# Patient Record
(demographics unavailable — no encounter records)

---

## 2024-11-06 NOTE — ASSESSMENT
[FreeTextEntry1] : Right BPPV The patient has benign paroxysmal positional vertigo of the posterior canal on the RIGHT -We reviewed the anatomy and etiology of the disease with the patient. -Epley maneuver was performed for the affected side in clinic today. -BPPV information sheet given to patient. -We have recommended that the patient sleep upright for 2 nights. -F/u in 1 week - if still imbalanced, order MRI and VNG  Bilateral Cerumen Impaction - Bilateral cerumen impaction removed under microscopy with instrumentation   Nasal Obstruction - Dymista 1 spray to each nostril bid - discussed the risks of Dymista spray which include but are not limited to: epistaxis, dry nose, pharyngitis, nasal irritation/pain, headache, sore throat, congestion, sinusitis, rhinorrhea - f/u in 1 week

## 2024-11-06 NOTE — PHYSICAL EXAM
[TextEntry] : General: AAO, no significant distress Psychiatric: affect pleasant and within normal limits Eyes: relatively symmetric, no obvious nystagmus Skin: no significant lesions on face Nose: no significant lesions; patent. Oral Cavity & Oropharynx: no significant deformity or lesions Neck/Lymphatics: no significant masses or abnormal cervical nodes palpated Respiratory: breathing comfortably; no significant distress Neurologic: cranial nerves II-XII grossly intact; EOMI Facial function: symmetric   Ear examination performed under binocular otologic microscope: Left Ear External: Pinna and periauricular area is normal. Canal: Ear canal skin is not inflamed or edematous. Left cerumen impaction cleaned under microscopy with instrumentation Tympanic Membrane: Intact and in good position.   Right Ear External: Pinna and periauricular area is normal. Canal: Ear canal skin is not inflamed or edematous. Right cerumen impaction cleaned under microscopy with instrumentation Tympanic Membrane: Intact and in good position.   Procedure: Left cerumen removal Pre-operative Diagnosis: Left cerumen impaction Post-operative Diagnosis: Left cerumen impaction Anesthesia: None Procedure Details: The patient was placed in the supine position. The left ear canal was determined to be impacted with cerumen. A curette and/or suction was used to remove the cerumen impaction under microscopy. Condition: Stable. Patient tolerated procedure well. Complications: None.   Procedure: Right cerumen removal Pre-operative Diagnosis: Right cerumen impaction Post-operative Diagnosis: Right cerumen impaction Anesthesia: None Procedure Details: The patient was placed in the supine position. The right ear canal was determined to be impacted with cerumen. A curette and/or suction was used to remove the cerumen impaction under microscopy. Condition: Stable. Patient tolerated procedure well. Complications: None.  Nasal Endoscopy:   Pre-operative Diagnosis: nasal congestion Post-operative Diagnosis: same Anesthesia: None Procedure: Bilateral nasal endoscopy Procedure Details:   The patient was placed in the seated position sitting straight up. The telescope was passed along the left nasal floor to the nasopharynx. It was then passed into the region of the middle meatus, middle turbinate, and the sphenoethmoid region. An identical procedure was performed on the right side.   Findings: Middle meatus: normal bilaterally Sphenoethmoidal recess: normal bilaterally Mucosa: normal bilaterally Nasal septum: OFF CREST BILATERALLY Discharge: none bilaterally Turbinates: ENLARGED INFERIOR TURBINATES BILATERALLY Adenoid: normal bilaterally Posterior choanae: normal bilaterally Eustachian tubes: normal bilaterally Mucous stranding: normal bilaterally Lesions: Not present   Comments: LOTS OF MUCOUS PRODUCTION   Condition: Stable. Patient tolerated procedure well.   [place] Hallpike POSITIVE on RIGHT, negative on left  Epley Manuever Procedure (Right): The patient was sat upright on a table. The patient's head was turned 45 degrees to the right and the patient was quickly laid down supine on the table with their head hanging off of the table. The patient endorsed vertigo. The patient was kept in this position until the vertigo subsided. The patient head was then turned 90 degrees to the left. This position was held for 15 seconds. The patient was then turned onto their left shoulder with their head kept in the same position such that they were looking at the ground. This position was held for 15 seconds. The patient was sat upright with their head tucked into their chest. The patient tolerated the procedure well. The patient was instructed to keep their head in a neutral position for 7 days and to sleep upright as best as possible for the next 2 nights.

## 2024-11-06 NOTE — HISTORY OF PRESENT ILLNESS
[de-identified] : 73F who presents with vertigo since 2022. When she lays down in bed, she has a few seconds of true spinning. For the past few years, she has felt slightly imbalance and wobbly while walking. No otalgia, otorrhea, tinnitus, hearing loss, pressure/fullness.

## 2024-11-13 NOTE — HISTORY OF PRESENT ILLNESS
[de-identified] : 73F who presents with vertigo since 2022. When she lays down in bed, she has a few seconds of true spinning. For the past few years, she has felt slightly imbalance and wobbly while walking. No otalgia, otorrhea, tinnitus, hearing loss, pressure/fullness.   11/13/24: F/u for R BPPV. She states that it has improved after the Epley maneuver. She also has right sided hearing loss. She also endorses snoring at night. She says she got an outside sleep study in the past.

## 2024-11-13 NOTE — ASSESSMENT
[FreeTextEntry1] : Right BPPV The patient has benign paroxysmal positional vertigo of the posterior canal on the RIGHT - resolved after epley manuever - patient is going to vestibular therapy  Nasal Obstruction - Continue Dymista 1 spray to each nostril bid - discussed the risks of Dymista spray which include but are not limited to: epistaxis, dry nose, pharyngitis, nasal irritation/pain, headache, sore throat, congestion, sinusitis, rhinorrhea  Conductive Hearing Loss - RIGHT - chronic conductive hearing loss with exacerbation - discussed the possible etiology of conductive hearing loss which includes otosclerosis, lateral chain fixation - discussed observation vs hearing aids vs surgery - discussed right middle ear exploration, possible stapedotomy, fascia graft - risks of middle ear exploration, stapedotomy discussed in detail and patient's questions answered. Risks include but are not limited to: perforation of eardrum, no improvement in hearing, increased hearing loss, dizziness, tinnitus (ringing), facial weakness, change in taste, infection, bleeding, extruded prosthesis, 1% risk of a dead ear - CT temporal bone scan wo contrast - f/u in 1 month

## 2024-11-13 NOTE — HISTORY OF PRESENT ILLNESS
[de-identified] : 73F who presents with vertigo since 2022. When she lays down in bed, she has a few seconds of true spinning. For the past few years, she has felt slightly imbalance and wobbly while walking. No otalgia, otorrhea, tinnitus, hearing loss, pressure/fullness.   11/13/24: F/u for R BPPV. She states that it has improved after the Epley maneuver. She also has right sided hearing loss. She also endorses snoring at night. She says she got an outside sleep study in the past.

## 2024-11-13 NOTE — PHYSICAL EXAM
[TextEntry] : General: AAO, no significant distress Psychiatric: affect pleasant and within normal limits Eyes: relatively symmetric, no obvious nystagmus Skin: no significant lesions on face Nose: no significant lesions; patent. Oral Cavity & Oropharynx: no significant deformity or lesions Neck/Lymphatics: no significant masses or abnormal cervical nodes palpated Respiratory: breathing comfortably; no significant distress Neurologic: cranial nerves II-XII grossly intact; EOMI Facial function: symmetric   Ear examination performed under binocular otologic microscope: Left Ear External: Pinna and periauricular area is normal. Canal: Ear canal skin is not inflamed or edematous. Tympanic Membrane: Intact and in good position.   Right Ear External: Pinna and periauricular area is normal. Canal: Ear canal skin is not inflamed or edematous. Tympanic Membrane: Intact and in good position.  [place] Hallpike negative on RIGHT, negative on left BC>AC bilaterally, Mondragon to RIGHT   Nasal Endoscopy:   Pre-operative Diagnosis: nasal congestion Post-operative Diagnosis: same Anesthesia: None Procedure: Bilateral nasal endoscopy Procedure Details:   The patient was placed in the seated position sitting straight up. The telescope was passed along the left nasal floor to the nasopharynx. It was then passed into the region of the middle meatus, middle turbinate, and the sphenoethmoid region. An identical procedure was performed on the right side.   Findings: Middle meatus: normal bilaterally Sphenoethmoidal recess: normal bilaterally Mucosa: normal bilaterally Nasal septum: OFF CREST BILATERALLY Discharge: none bilaterally Turbinates: ENLARGED INFERIOR TURBINATES BILATERALLY -> improved Adenoid: normal bilaterally Posterior choanae: normal bilaterally Eustachian tubes: normal bilaterally Mucous stranding: normal bilaterally Lesions: Not present   Comments: less mucous   Condition: Stable. Patient tolerated procedure well.

## 2024-11-13 NOTE — PHYSICAL EXAM
[TextEntry] : General: AAO, no significant distress Psychiatric: affect pleasant and within normal limits Eyes: relatively symmetric, no obvious nystagmus Skin: no significant lesions on face Nose: no significant lesions; patent. Oral Cavity & Oropharynx: no significant deformity or lesions Neck/Lymphatics: no significant masses or abnormal cervical nodes palpated Respiratory: breathing comfortably; no significant distress Neurologic: cranial nerves II-XII grossly intact; EOMI Facial function: symmetric   Ear examination performed under binocular otologic microscope: Left Ear External: Pinna and periauricular area is normal. Canal: Ear canal skin is not inflamed or edematous. Tympanic Membrane: Intact and in good position.   Right Ear External: Pinna and periauricular area is normal. Canal: Ear canal skin is not inflamed or edematous. Tympanic Membrane: Intact and in good position.  [place] Hallpike negative on RIGHT, negative on left BC>AC bilaterally, Mondragon to RIGHT   Nasal Endoscopy:   Pre-operative Diagnosis: nasal congestion Post-operative Diagnosis: same Anesthesia: None Procedure: Bilateral nasal endoscopy Procedure Details:   The patient was placed in the seated position sitting straight up. The telescope was passed along the left nasal floor to the nasopharynx. It was then passed into the region of the middle meatus, middle turbinate, and the sphenoethmoid region. An identical procedure was performed on the right side.   Findings: Middle meatus: normal bilaterally Sphenoethmoidal recess: normal bilaterally Mucosa: normal bilaterally Nasal septum: OFF CREST BILATERALLY Discharge: none bilaterally Turbinates: ENLARGED INFERIOR TURBINATES BILATERALLY -> improved Adenoid: normal bilaterally Posterior choanae: normal bilaterally Eustachian tubes: normal bilaterally Mucous stranding: normal bilaterally Lesions: Not present   Comments: less mucous   Condition: Stable. Patient tolerated procedure well.

## 2024-11-13 NOTE — DATA REVIEWED
[de-identified] : Audiogram personally reviewed and interpreted and my findings are as follows (11/13/24) Right: SRT 50dB; %; Type Ad tymp; moderate CHL Left: SRT 20dB; %; Type A tymp; mild SNHL (10dB ABG)

## 2024-11-13 NOTE — DATA REVIEWED
[de-identified] : Audiogram personally reviewed and interpreted and my findings are as follows (11/13/24) Right: SRT 50dB; %; Type Ad tymp; moderate CHL Left: SRT 20dB; %; Type A tymp; mild SNHL (10dB ABG)

## 2024-12-18 NOTE — DATA REVIEWED
[de-identified] : Audiogram personally reviewed and interpreted and my findings are as follows (11/13/24) Right: SRT 50dB; %; Type Ad tymp; moderate CHL Left: SRT 20dB; %; Type A tymp; mild SNHL (10dB ABG) [de-identified] : CT temporal bone scan slides visually reviewed and personally interpreted as follows (11/25/24): normal temporal bones bilaterally, no fenestral otosclerosis, possible near dehiscence of left superior semicircular canal, possible thinning of right IS joint  CT temporal bone scan radiology read: unremarkable ears, specifically no evidence of otosclerosis or semicircular canal dehiscence

## 2024-12-18 NOTE — DATA REVIEWED
[de-identified] : Audiogram personally reviewed and interpreted and my findings are as follows (11/13/24) Right: SRT 50dB; %; Type Ad tymp; moderate CHL Left: SRT 20dB; %; Type A tymp; mild SNHL (10dB ABG) [de-identified] : CT temporal bone scan slides visually reviewed and personally interpreted as follows (11/25/24): normal temporal bones bilaterally, no fenestral otosclerosis, possible near dehiscence of left superior semicircular canal, possible thinning of right IS joint  CT temporal bone scan radiology read: unremarkable ears, specifically no evidence of otosclerosis or semicircular canal dehiscence

## 2024-12-18 NOTE — ASSESSMENT
[FreeTextEntry1] : Right BPPV - R Epley performed on 11/6/24, not needed on 11/13/24, R Epley performed on 12/18/24 - Will do Andrade Daroff exercises daily this week  Nasal Obstruction / Bilateral inferior turbinate hypertrophy - Continue azelastine 1 spray to each nostril bid -> sent to pharmacy - discussed the risks of azelastine spray which include but are not limited to: epistaxis, dry nose, pharyngitis, nasal irritation/pain, headache, sore throat, congestion, sinusitis, rhinorrhea  Conductive Hearing Loss - RIGHT - chronic conductive hearing loss with exacerbation - discussed the possible etiology of conductive hearing loss which includes otosclerosis, lateral chain fixation, ossicular discontinuity - discussed observation vs hearing aids vs surgery - 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 Risks include but are not limited to: perforation of eardrum, no improvement in hearing, increased hearing loss, dizziness, tinnitus (ringing), facial weakness, change in taste, infection, bleeding, extruded prosthesis, 1% risk of a dead ear  - The total time spent on the date of this encounter was 60+ minutes. This includes time spent in both face-to-face and non face-to-face activities (review of tests/documents/independent historians and/or independent interpretation of tests). This excludes teaching and separately reported services.

## 2024-12-18 NOTE — PHYSICAL EXAM
[TextEntry] : General: AAO, no significant distress Psychiatric: affect pleasant and within normal limits Eyes: relatively symmetric, no obvious nystagmus Skin: no significant lesions on face Nose: no significant lesions; patent. Oral Cavity & Oropharynx: no significant deformity or lesions Neck/Lymphatics: no significant masses or abnormal cervical nodes palpated Respiratory: breathing comfortably; no significant distress Neurologic: cranial nerves II-XII grossly intact; EOMI Facial function: symmetric   Ear examination performed under binocular otologic microscope: Left Ear External: Pinna and periauricular area is normal. Canal: Ear canal skin is not inflamed or edematous. Tympanic Membrane: Intact and in good position.   Right Ear External: Pinna and periauricular area is normal. Canal: Ear canal skin is not inflamed or edematous. Tympanic Membrane: Intact and in good position.  [place] Hallpike POSITIVE on RIGHT, negative on left BC>AC on right, AC>BC on left, Mondragon to RIGHT  Epley Manuever Procedure (Right): The patient was sat upright on a table. The patient's head was turned 45 degrees to the right and the patient was quickly laid down supine on the table with their head hanging off of the table. The patient endorsed vertigo. The patient was kept in this position until the vertigo subsided. The patient head was then turned 90 degrees to the left. This position was held for 15 seconds. The patient was then turned onto their left shoulder with their head kept in the same position such that they were looking at the ground. This position was held for 15 seconds. The patient was sat upright with their head tucked into their chest. The patient tolerated the procedure well. The patient was instructed to keep their head in a neutral position for 7 days and to sleep upright as best as possible for the next 2 nights.  Nasal Endoscopy:   Pre-operative Diagnosis: nasal congestion Post-operative Diagnosis: same Anesthesia: None Procedure: Bilateral nasal endoscopy Procedure Details:   The patient was placed in the seated position sitting straight up. The telescope was passed along the left nasal floor to the nasopharynx. It was then passed into the region of the middle meatus, middle turbinate, and the sphenoethmoid region. An identical procedure was performed on the right side.   Findings: Middle meatus: normal bilaterally Sphenoethmoidal recess: normal bilaterally Mucosa: normal bilaterally Nasal septum: OFF CREST BILATERALLY Discharge: none bilaterally Turbinates: ENLARGED INFERIOR TURBINATES BILATERALLY -> improved Adenoid: normal bilaterally Posterior choanae: normal bilaterally Eustachian tubes: normal bilaterally Mucous stranding: normal bilaterally Lesions: Not present   Comments: less mucous   Condition: Stable. Patient tolerated procedure well.

## 2025-03-06 NOTE — PROCEDURE
[de-identified] : LARYNGOSCOPY EXAM:  Indication: -Verbal consent was obtained from patient prior to procedure. -Oxymetazoline 0.05% and lidocaine 2% spray applied to the nasal cavities. Flexible laryngoscopy was performed via       nostril and revealed the following:   -- Nasopharynx had no mass or exudate.   -- Base of tongue was symmetric and not enlarged.   -- Vallecula was clear   -- Epiglottis, both aryepiglottic folds and both false vocal folds were normal   -- Arytenoids both without edema and erythema    -- True vocal folds were fully mobile and without lesions.    -- Post cricoid area was clear.   -- Interarytenoid edema was absent     present.   -- No lesion seen in laryngopharynx The patient tolerated the procedure well.

## 2025-03-06 NOTE — GOALS
[Patient] : patient [FreeTextEntry7] : PATIENT IS GONNA HAVE A SURGERY ON 3/14/25 AND WOULD NEED TO BE OUT OF WORK TILL MARCH 31, 2025.

## 2025-03-06 NOTE — PHYSICAL EXAM
[FreeTextEntry1] : No hoarseness.  [de-identified] : Thyroid gland normal size, no palpable nodules.  Ptotic, NT, normal size submandibular glands bilaterally. [] : septum deviated to the left [de-identified] : inferior turbinate hypertrophy R>L [Midline] : trachea located in midline position [Laryngoscopy Performed] : laryngoscopy was performed, see procedure section for findings [de-identified] : Mallampati 2 airway.  Tongue is normal. [de-identified] : A few teeth are missing. [de-identified] : 1+ bilateral  [Normal] : no neck adenopathy

## 2025-03-06 NOTE — PROCEDURE
[de-identified] : LARYNGOSCOPY EXAM:  Indication: -Verbal consent was obtained from patient prior to procedure. -Oxymetazoline 0.05% and lidocaine 2% spray applied to the nasal cavities. Flexible laryngoscopy was performed via       nostril and revealed the following:   -- Nasopharynx had no mass or exudate.   -- Base of tongue was symmetric and not enlarged.   -- Vallecula was clear   -- Epiglottis, both aryepiglottic folds and both false vocal folds were normal   -- Arytenoids both without edema and erythema    -- True vocal folds were fully mobile and without lesions.    -- Post cricoid area was clear.   -- Interarytenoid edema was absent     present.   -- No lesion seen in laryngopharynx The patient tolerated the procedure well.

## 2025-03-06 NOTE — HISTORY OF PRESENT ILLNESS
[de-identified] : Ms. BEAN is a 73-year-old woman who was referred by Dr. Sims for snoring. Her  provided information over the phone.  For about the last 5 years, she has loud snoring, gasping for breath during sleep and occasional nighttime awakenings.  She often has morning headache.  She does not feel refreshed when she wakes from sleep.  She sleeps with her mouth open.  Sometimes she can feel her tongue touching the back of her throat. She has chronic nasal congestion, which has improved with use of Dymista and ipratropium nasal spray. She has known deviated nasal septum. Sleep study (2022 at [facility]) did not show obstructive sleep apnea - AHI 1.8.

## 2025-03-06 NOTE — ASSESSMENT
[FreeTextEntry1] : Ms. BEAN is a 74 yearold woman who was evaluated for the following issues today:   1.) chronic loud snoring, witnessed gasping for breath, nighttime awakening, morning headache and non-restful sleep for about last 5 years.  PSG in 2022 at [facility] showed AHI 1.8. On exam, BMI 20.  Mallampati 2 airway.  [place] tonsils very small.  Floppy, low soft palate.  Tongue base more posterior position on laryngoscopy. --> in-lab sleep study (she cannot manage the technology for home study) since may also have UARS --> try using wedge pillow for HOB elevation in sleep  2.) chronic nasal obstruction due to deviated nasal septum (left) and inferior turbinate hypertrophy  Breathing better on azelastine but congestion not completely resolved. Nasal breathing would be improved by septoplasty and inferior turbinate reduction.    Return after sleep study

## 2025-03-06 NOTE — HISTORY OF PRESENT ILLNESS
[de-identified] : Ms. BEAN is a 73-year-old woman who was referred by Dr. Sims for snoring. Her  provided information over the phone.  For about the last 5 years, she has loud snoring, gasping for breath during sleep and occasional nighttime awakenings.  She often has morning headache.  She does not feel refreshed when she wakes from sleep.  She sleeps with her mouth open.  Sometimes she can feel her tongue touching the back of her throat. She has chronic nasal congestion, which has improved with use of Dymista and ipratropium nasal spray. She has known deviated nasal septum. Sleep study (2022 at [place]) did not show obstructive sleep apnea - AHI 1.8.

## 2025-03-06 NOTE — ASSESSMENT
[FreeTextEntry1] : Ms. BEAN is a 72 yearold woman who was evaluated for the following issues today:   1.) chronic loud snoring, witnessed gasping for breath, nighttime awakening, morning headache and non-restful sleep for about last 5 years.  PSG in 2022 at [facility] showed AHI 1.8. On exam, BMI 20.  Mallampati 2 airway.  [place] tonsils very small.  Floppy, low soft palate.  Tongue base more posterior position on laryngoscopy. --> in-lab sleep study (she cannot manage the technology for home study) since may also have UARS --> try using wedge pillow for HOB elevation in sleep  2.) chronic nasal obstruction due to deviated nasal septum (left) and inferior turbinate hypertrophy  Breathing better on azelastine but congestion not completely resolved. Nasal breathing would be improved by septoplasty and inferior turbinate reduction.    Return after sleep study

## 2025-03-06 NOTE — PHYSICAL EXAM
[FreeTextEntry1] : No hoarseness.  [de-identified] : Thyroid gland normal size, no palpable nodules.  Ptotic, NT, normal size submandibular glands bilaterally. [] : septum deviated to the left [de-identified] : inferior turbinate hypertrophy R>L [Midline] : trachea located in midline position [Laryngoscopy Performed] : laryngoscopy was performed, see procedure section for findings [de-identified] : Mallampati 2 airway.  Tongue is normal. [de-identified] : A few teeth are missing. [de-identified] : 1+ bilateral  [Normal] : no neck adenopathy

## 2025-04-07 NOTE — HISTORY OF PRESENT ILLNESS
[de-identified] : INITIAL VISIT 3/06/2025 Ms. BEAN is a 73-year-old woman who was referred by Dr. Sims for snoring and possible INDER. Her  provided information over the phone. For about the last 5 years, she has loud snoring, gasping for breath during sleep and occasional nighttime awakenings.  She often has morning headache.  She does not feel refreshed when she wakes from sleep.  She sleeps with her mouth open.  Sometimes she can feel her tongue touching the back of her throat. She has chronic nasal congestion, which has improved with use of Dymista and ipratropium nasal spray. She has known deviated nasal septum. Sleep study (2022 at [facility]) did not show obstructive sleep apnea - AHI 1.8.  VISIT 4/07/2025 Ms. BEAN had in-lab sleep study done that showed moderate INDER (AHI 16.2). She still feels tired when she wakes from sleep.  Snoring and gasping in sleep still noted by her . Nasal congestion on left side helped a little by azelastine nasal spray.  Has deviated nasal septum and inferior turbinate hypertrophy.  Hx of rhinoplasty for dorsal hump in past.   SLEEP STUDY (3/12/2025) at [facility]  - Moderate INDER  - AHI 16.2 (4% desaturation criteria) and 19.7 (3% criteria). REM AHI 40 (3% criteria) - Snoring noted - O2 sat mean 96%, danitza 74%.  O2 sat =/< 88% for 2.8 minutes of sleep time. - Arousal index 16.9 (respiratory related 8.3) - PLMs 62.2/hr, with PLM-related arousals 1.1/hr. - EKG avg 66 bpm, with NSR predominant

## 2025-04-07 NOTE — PHYSICAL EXAM
[FreeTextEntry1] : No hoarseness.  [de-identified] : Thyroid gland normal size, no palpable nodules.  Ptotic, NT, normal size submandibular glands bilaterally. [] : septum deviated to the left [de-identified] : inferior turbinate hypertrophy R>L [de-identified] : Mallampati 2 airway.  Tongue is normal. [de-identified] : A few teeth are missing. [de-identified] : 1+ bilateral  [Normal] : no neck adenopathy

## 2025-04-07 NOTE — CONSULT LETTER
[Dear  ___] : Dear  [unfilled], [Courtesy Letter:] : I had the pleasure of seeing your patient, [unfilled], in my office today. [Please see my note below.] : Please see my note below. [Sincerely,] : Sincerely, [FreeTextEntry2] : Lawrence Bernabe MD [address] [place] [id number]  [FreeTextEntry3] :  Demetra Diamond MD  Otolaryngology, Head and Neck Surgery     [DrCelina  ___] : Dr. CH [___] : [unfilled]

## 2025-04-07 NOTE — ASSESSMENT
[FreeTextEntry1] : Ms. BEAN is a 73-year-old woman who was evaluated for the following issues today:   1.) Moderate INDER on sleep study last month.  O2 danitza 74% during study. I reviewed   2.)   3.)       Return